# Patient Record
(demographics unavailable — no encounter records)

---

## 2025-05-07 NOTE — PHYSICAL EXAM
[General Appearance - Well Developed] : well developed [General Appearance - Well Nourished] : well nourished [Normal Appearance] : normal appearance [Well Groomed] : well groomed [General Appearance - In No Acute Distress] : no acute distress [Heart Rate And Rhythm] : heart rate and rhythm were normal [Edema] : no peripheral edema [Respiration, Rhythm And Depth] : normal respiratory rhythm and effort [Exaggerated Use Of Accessory Muscles For Inspiration] : no accessory muscle use [Auscultation Breath Sounds / Voice Sounds] : lungs were clear to auscultation bilaterally [Chest Palpation] : palpation of the chest revealed no abnormalities [Lungs Percussion] : the lungs were normal to percussion [Bowel Sounds] : normal bowel sounds [Abdomen Soft] : soft [Abdomen Tenderness] : non-tender [Abdomen Mass (___ Cm)] : no abdominal mass palpated [Abdomen Hernia] : no hernia was discovered [Costovertebral Angle Tenderness] : no ~M costovertebral angle tenderness [Urethral Meatus] : meatus normal [Urinary Bladder Findings] : the bladder was normal on palpation [Scrotum] : the scrotum was normal [Epididymis] : the epididymides were normal [Testes Tenderness] : no tenderness of the testes [Testes Mass (___cm)] : there were no testicular masses [Normal Station and Gait] : the gait and station were normal for the patient's age [] : no rash [No Focal Deficits] : no focal deficits [Oriented To Time, Place, And Person] : oriented to person, place, and time [Affect] : the affect was normal [Mood] : the mood was normal [Not Anxious] : not anxious [No Palpable Adenopathy] : no palpable adenopathy [Cervical Lymph Nodes Enlarged Posterior Bilaterally] : posterior cervical [Cervical Lymph Nodes Enlarged Anterior Bilaterally] : anterior cervical [Supraclavicular Lymph Nodes Enlarged Bilaterally] : supraclavicular [Axillary Lymph Nodes Enlarged Bilaterally] : axillary [Femoral Lymph Nodes Enlarged Bilaterally] : femoral [Inguinal Lymph Nodes Enlarged Bilaterally] : inguinal [Chaperone Present] : A chaperone was present in the examining room during all aspects of the physical examination

## 2025-05-08 NOTE — ASSESSMENT
[FreeTextEntry1] : The patient returns for followup.  He was last seen November 29, 2022.  At that time he had been using 0.15 cc of trimix recenttly with good results.he previously had it prescribe by another provider.  He feels comfortable with technique. He has no new medical issues since the interim. He needs a refill today.  I have represcribed his medication at a slightly higher concentration and we will see him back after the new year in follow-up.   Consultation: 35 minutes which excludes teaching and separately reported service but includes reviewing his history and discussing prior results, discussing various treatment options, writing medication prescriptions, requests for lab testing and writing his note. There was also additional time in preparing for the visit.

## 2025-05-08 NOTE — HISTORY OF PRESENT ILLNESS
[FreeTextEntry1] : The patient returns for followup.  He was last seen November 29, 2022.  At that time he had been using 0.15 cc of trimix recenttly with good results.he previously had it prescribe by another provider.  He feels comfortable with technique. He has no new medical issues since the interim. He needs a refill today.  PMH: Pt  has a [facility] prostate cancer s/p prostatectomy who presents with erectile dysfunction since 2014 after having a prostatectomy for prostate cancer. Pt referred by Dr. Lim in [place]. Pt was seen by Dr. Mckinney in 2015 and was started on trimix using 15 units. Pt states he injects approx 10 units and and sometime it lasts to 4 am and sometimes gets painful last time injected approx 3 days ago. Pt has no other complaints at this time.